# Patient Record
Sex: FEMALE | Race: WHITE | NOT HISPANIC OR LATINO
[De-identification: names, ages, dates, MRNs, and addresses within clinical notes are randomized per-mention and may not be internally consistent; named-entity substitution may affect disease eponyms.]

---

## 2023-01-31 PROBLEM — Z00.00 ENCOUNTER FOR PREVENTIVE HEALTH EXAMINATION: Status: ACTIVE | Noted: 2023-01-31

## 2023-02-07 ENCOUNTER — NON-APPOINTMENT (OUTPATIENT)
Age: 43
End: 2023-02-07

## 2023-02-07 ENCOUNTER — APPOINTMENT (OUTPATIENT)
Dept: CARDIOLOGY | Facility: CLINIC | Age: 43
End: 2023-02-07
Payer: COMMERCIAL

## 2023-02-07 VITALS
HEART RATE: 71 BPM | SYSTOLIC BLOOD PRESSURE: 129 MMHG | HEIGHT: 67 IN | WEIGHT: 162 LBS | BODY MASS INDEX: 25.43 KG/M2 | OXYGEN SATURATION: 99 % | DIASTOLIC BLOOD PRESSURE: 66 MMHG

## 2023-02-07 DIAGNOSIS — Z87.891 PERSONAL HISTORY OF NICOTINE DEPENDENCE: ICD-10-CM

## 2023-02-07 DIAGNOSIS — Z82.49 FAMILY HISTORY OF ISCHEMIC HEART DISEASE AND OTHER DISEASES OF THE CIRCULATORY SYSTEM: ICD-10-CM

## 2023-02-07 DIAGNOSIS — E78.5 HYPERLIPIDEMIA, UNSPECIFIED: ICD-10-CM

## 2023-02-07 DIAGNOSIS — Z86.39 PERSONAL HISTORY OF OTHER ENDOCRINE, NUTRITIONAL AND METABOLIC DISEASE: ICD-10-CM

## 2023-02-07 DIAGNOSIS — Z78.9 OTHER SPECIFIED HEALTH STATUS: ICD-10-CM

## 2023-02-07 DIAGNOSIS — E66.3 OVERWEIGHT: ICD-10-CM

## 2023-02-07 DIAGNOSIS — R07.9 CHEST PAIN, UNSPECIFIED: ICD-10-CM

## 2023-02-07 PROCEDURE — 99204 OFFICE O/P NEW MOD 45 MIN: CPT | Mod: 25

## 2023-02-07 PROCEDURE — 93000 ELECTROCARDIOGRAM COMPLETE: CPT

## 2023-02-08 ENCOUNTER — NON-APPOINTMENT (OUTPATIENT)
Age: 43
End: 2023-02-08

## 2023-02-08 PROBLEM — Z78.9 SOCIAL ALCOHOL USE: Status: ACTIVE | Noted: 2023-02-08

## 2023-02-08 PROBLEM — Z82.49 FAMILY HISTORY OF ATRIAL FIBRILLATION: Status: ACTIVE | Noted: 2023-02-08

## 2023-02-08 PROBLEM — E78.5 HYPERLIPIDEMIA: Status: ACTIVE | Noted: 2023-02-07

## 2023-02-08 PROBLEM — Z82.49 FAMILY HISTORY OF CONGESTIVE HEART FAILURE: Status: ACTIVE | Noted: 2023-02-08

## 2023-02-08 PROBLEM — E66.3 OVERWEIGHT: Status: ACTIVE | Noted: 2023-02-08

## 2023-02-08 PROBLEM — Z87.891 FORMER SMOKER: Status: ACTIVE | Noted: 2023-02-08

## 2023-02-08 PROBLEM — Z86.39 HISTORY OF HYPERLIPIDEMIA: Status: RESOLVED | Noted: 2023-02-08 | Resolved: 2023-02-08

## 2023-02-08 NOTE — HISTORY OF PRESENT ILLNESS
[FreeTextEntry1] : 42-year-old female\par Cardiology consultation requested by Dr. Villegas because of chest pain\par \par Tory has no known heart disease.  There is no history of a myocardial infarction angina or  congestive heart failure .\par \par Ms. Moreno has suffered from chest pain for the last several years.  1 to 2 years ago she experience chest pain and was evaluated at an urgent care center and subsequently sent to an emergency room.  Tests were performed which ruled out a pulmonary embolus.  The  electrocardiogram was reportedly unremarkable.  She was diagnosed as having a "panic attack."  The medical records are not available at this time for review\par \par The chest pains are described as sharp and severe located in the anterior sternal area.  The symptoms occur at rest without associated diaphoresis nausea or dyspnea.  She is an avid runner and is able to exercise running without restriction or limitation.  \par \par There is no history of diabetes or hypertension.  She was told of elevated cholesterol levels in the past but has not required medical intervention.  She smoked 1 pack of cigarettes daily stopping in 2014.  Her father has congestive heart failure atrial fibrillation and has required an implantable defibrillator\par \par \par Tory presents today for cardiovascular evaluation

## 2023-02-08 NOTE — REVIEW OF SYSTEMS
[Feeling Fatigued] : feeling fatigued [Chest Discomfort] : chest discomfort [Joint Pain] : joint pain [Negative] : Heme/Lymph [FreeTextEntry5] : See history of present illness

## 2023-02-08 NOTE — PHYSICAL EXAM
[Normal Conjunctiva] : normal conjunctiva [Normal S1, S2] : normal S1, S2 [Clear Lung Fields] : clear lung fields [Soft] : abdomen soft [Non Tender] : non-tender [Normal Bowel Sounds] : normal bowel sounds [Normal Gait] : normal gait [No Rash] : no rash [No Focal Deficits] : no focal deficits [de-identified] : Appears fit in no distress lying flat [de-identified] : No jugular venous distention.  No carotid bruit [de-identified] : No murmur.  No gallop.  No diastolic sounds.  Point of maximal impulse normal and not displaced [de-identified] : Full range of motion [de-identified] : No peripheral edema.  Dorsalis pedis pulses +2 bilaterally.  Feet warm and well-perfused.  No ulcerations. [de-identified] : Pleasant

## 2023-02-08 NOTE — DISCUSSION/SUMMARY
[FreeTextEntry1] : Chest pain\par The working diagnosis is noncardiac musculoskeletal chest pain.  The history is compelling for this diagnosis.  The differential diagnosis would include myocardial ischemia/atherosclerotic heart disease.  However the history and resting 2/7/2023 electrocardiogram showing no evidence of myocardial ischemia or infarction do not support this diagnosis.  A noninvasive cardiac evaluation would be helpful for management decisions\par \par I have recommended the following\par a.  Reassurance\par b.  Echocardiogram\par c.  Stress treadmill ECG study\par \par \par \par Hyperlipidemia\par Tory was told of elevated lipid levels in the past.  The target LDL level for primary prevention is about 100.  Nonpharmacological therapy, specifically diet and exercise are emphasized as major aspects of treatment.  The main clinical decision involves whether or not to institute HMG Co[employer] a reductase inhibitor therapy.\par \par I have recommended the following\par a.  Screening exercise treadmill ECG study\par b.  Routine laboratory studies including fasting lipid profile is ordered\par c.  Target LDL level to about 100 as discussed above\par \par \par \par Overweight\par Being overweight will exacerbate any cardiovascular issues.  Today Tory is 5 feet 7 inches tall and weighs 162 pounds.  Diet exercise and weight loss are advised.\par \par \par \par \par The diagnosis, prognosis, risks, options and alternatives were explained at length to the patient.  All questions were answered.  Issues discussed included chest pain atherosclerotic heart disease hyperlipidemia noninvasive cardiac testing diet and exercise

## 2024-07-31 ENCOUNTER — APPOINTMENT (OUTPATIENT)
Dept: CARDIOLOGY | Facility: CLINIC | Age: 44
End: 2024-07-31

## 2024-07-31 ENCOUNTER — APPOINTMENT (OUTPATIENT)
Dept: CARDIOLOGY | Facility: CLINIC | Age: 44
End: 2024-07-31
Payer: COMMERCIAL

## 2024-07-31 DIAGNOSIS — I49.3 VENTRICULAR PREMATURE DEPOLARIZATION: ICD-10-CM

## 2024-07-31 PROCEDURE — 93242 EXT ECG>48HR<7D RECORDING: CPT

## 2024-07-31 PROCEDURE — 93015 CV STRESS TEST SUPVJ I&R: CPT

## 2024-08-08 ENCOUNTER — APPOINTMENT (OUTPATIENT)
Dept: CARDIOLOGY | Facility: CLINIC | Age: 44
End: 2024-08-08

## 2024-08-08 PROCEDURE — 93306 TTE W/DOPPLER COMPLETE: CPT

## 2024-08-11 PROCEDURE — 93244 EXT ECG>48HR<7D REV&INTERPJ: CPT
